# Patient Record
Sex: FEMALE | Race: WHITE
[De-identification: names, ages, dates, MRNs, and addresses within clinical notes are randomized per-mention and may not be internally consistent; named-entity substitution may affect disease eponyms.]

---

## 2019-02-03 ENCOUNTER — HOSPITAL ENCOUNTER (EMERGENCY)
Dept: HOSPITAL 80 - FED | Age: 19
Discharge: HOME | End: 2019-02-03
Payer: COMMERCIAL

## 2019-02-03 VITALS — SYSTOLIC BLOOD PRESSURE: 116 MMHG | DIASTOLIC BLOOD PRESSURE: 71 MMHG

## 2019-02-03 DIAGNOSIS — R50.9: Primary | ICD-10-CM

## 2019-02-03 DIAGNOSIS — R09.81: ICD-10-CM

## 2019-02-03 DIAGNOSIS — R05: ICD-10-CM

## 2019-02-03 DIAGNOSIS — R19.7: ICD-10-CM

## 2019-02-03 NOTE — EDPHY
H & P


Smoking Status: Never smoked


Time Seen by Provider: 02/03/19 13:42


HPI/ROS: 





CHIEF COMPLAINT:  "I think I have the flu"





HISTORY OF PRESENT ILLNESS:  18-year-old immunocompetent female with no 

seasonal influenza vaccination complaining of less than 24 hr of flu-like 

symptoms, myalgias, fever, rhinorrhea, nonproductive cough, loose stool.  No 

melena hematochezia  No nuchal rigidity.  No headache.  No back or flank pain.  

No urinary abnormality.  No abdominal pain.  No nausea or vomiting.  No rash. 





PRIMARY CARE PROVIDER: Student [facility] 





REVIEW OF SYSTEMS:


10 systems reviewed and negative with the exception of the elements mentioned 

in the history of present illness








PAST MEDICAL & SURGICAL  HISTORY:  No seasonal influenza vaccination





SOCIAL HISTORY:  Nonsmoker.  Student.   














************


PHYSICAL EXAM





(Prior to examination, patient consented to physical exam, hands were washed 

and my usual and customary physical exam procedures followed)


1) GENERAL: Well-developed, well-nourished, alert and oriented.  Appears to be 

in no acute distress.  Smiling, answering questions appropriately.  Appears 

well.  Appears nontoxic.


2) HEAD: Normocephalic, atraumatic


3) HEENT: Pupils equal, round, reactive to light bilaterally.  Sclera 

anicteric.  Nasopharynx, oropharynx, clear, no lesions. Moist Mucous membranes.

  Tonsillar enlargement or exudate.  Ears bilaterally with normal tympanic 

membranes.  No evidence of otitis media otitis externa.


4) NECK: Full range of motion, no meningeal signs.  No adenopathy.


5) LUNGS: Clear auscultation bilaterally, no wheezes, no rhonchi, no 

retractions.   


6) HEART: Regular rate and rhythm, no murmur, no heave, no gallop.


7) ABDOMEN: No guarding, no rebound, no focal tenderness, negative McBurney's, 

negative Baum's, negative Rovsing's, negative peritoneal sign,


8) MUSCULOSKELETAL: Moving all extremities, no focal areas of tenderness, no 

obvious trauma.  No peripheral edema or discoloration.


9) BACK: No CVA tenderness, no midline vertebral tenderness, no fluctuance, no 

step-off, no obvious trauma, no visual or palpable abnormality. 


10) SKIN: No rash, no petechiae. 


11) Psychiatric:  Patient is oriented X 3, there is no agitation.


12) NEURO: Awake, alert, and oriented to person, place and time.  Answers 

questions appropriately.  There were no obvious focal neurologic abnormalities.

  No cerebellar dysfunction.  Normal steady gait.  Upper and lower extremities 

bilaterally with strength 5 / 5, reflexes 2+.








***************





DIFFERENTIAL DIAGNOSIS:  In no particular order including but not limited to 

influenza, pneumonia, meningitis, bronchitis, viral URI 


 (BABATUNDE Yi)


Constitutional: 





 Initial Vital Signs











Temperature (C)  36.5 C   02/03/19 13:37


 


Heart Rate  99   02/03/19 13:37


 


Respiratory Rate  16   02/03/19 13:37


 


Blood Pressure  114/74   02/03/19 13:37


 


O2 Sat (%)  99   02/03/19 13:37








 











O2 Delivery Mode               Room Air














Allergies/Adverse Reactions: 


 





No Known Allergies Allergy (Unverified 02/03/19 13:36)


 








Home Medications: 














 Medication  Instructions  Recorded


 


Bcp  02/03/19


 


Ibuprofen  02/03/19


 


Oseltamivir Phosphate [Tamiflu] 75 mg PO BIDMEAL 5 Days  cap 02/03/19


 


PRISTIQ  02/03/19


 


VYVANSE  02/03/19














MDM/Departure





- MDM


ED Course/Re-evaluation: 





High clinical suspicion for influenza.  Recommended empiric treatment with 

Tamiflu as she was with within the treatment window.  We discussed diagnostic 

testing which patient agrees she does not feel she necessitates as have a high 

clinical suspicion for influenza and no influenza vaccination.  Doubt 

meningitis.  The patient has a nontender abdomen, no complaints of abdominal 

pain, doubt acute surgical abdominal pathology. (BABATUNDE Yi)





The patient was evaluated and managed by the physician assistant.  I have 

reviewed this chart and I agree with the findings and plan of care as documented

, as indicated by my signature.  I am the secondary supervising physician. (

Le Perez)





- Depart


Disposition: Home, Routine, Self-Care


Clinical Impression: 


 Influenza-like illness





Condition: Good


Instructions:  Influenza (ED)


Additional Instructions: 


___________________________________________________


Return to the emergency department immediately for change in breathing habits, 

change in voice, change in swallowing habits, change in mental status, or any 

other symptoms that concern you.


__________________________________________________





Adult Pain & Fever Control:


We recommend Acetaminophen (Tylenol) and Ibuprofen (Motrin,Advil) for pain and 

fever control.  When fever is high or pain severe, both drugs can be used at 

the same time, but at different intervals.  Please note the time differences.  


Your dose is:     Acetaminophen 650mg every 4 to 6 hours


        Ibuprofen 6006mg every 6 hours with food   OR


      


Note:  do not take Acetaminophen with Hydrocodone (Vicodin, Lortab) or Oycodone 

(Percocet).  These medications also contain Acetaminophen.  No more than 3000mg 

of Acetaminophen should be taken in 24 hours (for an adult).


Stand Alone Forms:  School Excuse


Prescriptions: 


Oseltamivir Phosphate [Tamiflu] 75 mg PO BIDMEAL 5 Days  cap


Referrals: 


[facility],. [Clinic] - 2-3 days, call for appt.

## 2019-04-22 ENCOUNTER — HOSPITAL ENCOUNTER (EMERGENCY)
Dept: HOSPITAL 80 - FED | Age: 19
Discharge: HOME | End: 2019-04-22
Payer: COMMERCIAL

## 2019-04-22 VITALS — SYSTOLIC BLOOD PRESSURE: 111 MMHG | DIASTOLIC BLOOD PRESSURE: 71 MMHG

## 2019-04-22 DIAGNOSIS — F17.200: ICD-10-CM

## 2019-04-22 DIAGNOSIS — R09.1: Primary | ICD-10-CM

## 2019-04-22 DIAGNOSIS — R79.1: ICD-10-CM

## 2019-04-22 NOTE — EDPHY
H & P


Stated Complaint: sent from [facility] with + d dimer


Time Seen by Provider: 04/22/19 14:46


HPI/ROS: 





CHIEF COMPLAINT:  Chest pain, elevated D-dimer





HISTORY OF PRESENT ILLNESS:  The patient is referred to the ED from the 

[facility] for evaluation chest pain and an 

abnormal D-dimer.  The patient has had a several week history of chest pain, 

cough, upper respiratory infectious symptoms and vague abdominal pain.  She has 

been treated for bronchitis with steroids and recently prednisone and a Z-Kennedy.  

The patient has had complaints of mid sternal chest pain for the past 

discomfort which is pleuritic in nature.  There are some symptoms associated 

with swallowing.  Patient has been using over-the-counter medications for 

heartburn.  The patient does smoke occasionally.  She is on oral contraceptive 

pills.  She denies any asymmetric calf pain or swelling.  She is planning on 

following up with a gastroenterologist in the next week for evaluation of 

chronic dyspepsia.





REVIEW OF SYSTEMS:


A comprehensive 10 point review of systems is otherwise negative aside from 

elements mentioned in the history of present illness.


Source: Patient


Exam Limitations: No limitations





- Personal History


LMP (Females 10-55): 8-14 Days Ago


Current Tetanus Diphtheria and Acellular Pertussis (TDAP): Yes





- Medical/Surgical History


Hx Asthma: No


Hx Chronic Respiratory Disease: No


Hx Diabetes: No


Hx Cardiac Disease: No


Hx Renal Disease: No


Hx Cirrhosis: No


Hx Alcoholism: No


Hx HIV/AIDS: No


Hx Splenectomy or Spleen Trauma: No


Other PMH: tonsilectomy, adenoidectomy





- Social History


Smoking Status: Current some day smoker





- Physical Exam


Exam: 





General Appearance:  Tearful, anxious


Eyes:  Pupils equal and round no pallor or injection


ENT, Mouth:  Mucous membranes moist


Respiratory:  There are no retractions, lungs are clear to auscultation


Cardiovascular:  Regular rate and rhythm


Gastrointestinal:  Abdomen is soft and nontender, no masses, bowel sounds normal


Neurological:  A&O, normal motor function, normal sensory exam, normal cranial 

nerves


Skin:  Warm and dry, no rashes


Musculoskeletal:  Neck is supple nontender


Extremities:  No clinical evidence of DVT








Constitutional: 


 Initial Vital Signs











Temperature (C)  36.6 C   04/22/19 14:24


 


Heart Rate  106 H  04/22/19 14:24


 


Respiratory Rate  19   04/22/19 14:24


 


Blood Pressure  104/77   04/22/19 14:24


 


O2 Sat (%)  97   04/22/19 14:24








 











O2 Delivery Mode               Room Air














Allergies/Adverse Reactions: 


 





No Known Allergies Allergy (Verified 04/22/19 14:23)


 








Home Medications: 














 Medication  Instructions  Recorded


 


Bcp  02/03/19


 


PRISTIQ  02/03/19


 


VYVANSE  02/03/19


 


Buspar (*)  04/22/19














Medical Decision Making


ED Course/Re-evaluation: 





I reviewed the patient's outpatient records from the [facility].  

The patient presents to the ED with multiple complaints suggestive of chronic 

bronchitis as well as pleuritic chest pain.  Additionally she has been 

struggling with some chronic GI complaints.  The patient was noted to have an 

elevated D-dimer.  She was taken for CT pulmonary angiogram which demonstrated 

no evidence of a PE.





The patient remained hemodynamically stable throughout her stay in the 

emergency department.  I find her abdominal examination to be benign.  She is 

in a normal sinus rhythm.





The patient will be discharged home with instructions to continue her regular 

outpatient medications.  She is scheduled to see a gastroenterologist in the 

coming week.








Differential Diagnosis: 





Differential diagnosis considered includes pulmonary embolism, pleurisy, 

pneumonia, bronchitis





- Data Points


Laboratory Results: 


 











  04/22/19





  14:59


 


POC Hgb  13.9 gm/dL gm/dL





   (12.6-16.3) 


 


POC Hct  41 % %





   (38-47) 


 


POC Sodium  138 mEq/L mEq/L





   (135-145) 


 


POC Potassium  3.8 mEq/L mEq/L





   (3.3-5.0) 


 


POC Chloride  104 mEq/L mEq/L





   () 


 


POC Total CO2  21 mEq/L L mEq/L





   (22-31) 


 


POC BUN  4 mg/dL L mg/dL





   (7-23) 


 


POC Creatinine  0.7 mg/dL mg/dL





   (0.6-1.0) 


 


POC Glucose  90 mg/dL mg/dL





   () 











Point of Care Test Results: 


 Chemistry











  04/22/19





  14:59


 


POC Sodium  138 mEq/L mEq/L





   (135-145) 


 


POC Potassium  3.8 mEq/L mEq/L





   (3.3-5.0) 


 


POC Chloride  104 mEq/L mEq/L





   () 


 


POC Total CO2  21 mEq/L L mEq/L





   (22-31) 


 


POC BUN  4 mg/dL L mg/dL





   (7-23) 


 


POC Creatinine  0.7 mg/dL mg/dL





   (0.6-1.0) 


 


POC Glucose  90 mg/dL mg/dL





   () 








 ISTAT H&H











  04/22/19





  14:59


 


POC Hgb  13.9 gm/dL gm/dL





   (12.6-16.3) 


 


POC Hct  41 % %





   (38-47) 














Departure





- Departure


Disposition: Home, Routine, Self-Care


Clinical Impression: 


 Pleurisy





Condition: Good


Instructions:  Pleurisy (ED)


Additional Instructions: 


1. Please continue your regular outpatient medications.


2.  The CT scan demonstrates no evidence of a pulmonary embolism or other acute 

abnormality.  


3. Return to the ED for markedly worsening symptoms or other concerns.








Referrals: 


[facility] [Other] - As per Instructions